# Patient Record
Sex: FEMALE | Race: WHITE | Employment: FULL TIME | URBAN - METROPOLITAN AREA
[De-identification: names, ages, dates, MRNs, and addresses within clinical notes are randomized per-mention and may not be internally consistent; named-entity substitution may affect disease eponyms.]

---

## 2018-07-20 ENCOUNTER — HOSPITAL ENCOUNTER (OUTPATIENT)
Dept: MAMMOGRAPHY | Age: 43
Discharge: HOME | End: 2018-07-22
Payer: COMMERCIAL

## 2018-07-20 DIAGNOSIS — Z12.39 BREAST SCREENING: ICD-10-CM

## 2018-07-20 PROCEDURE — 77067 SCR MAMMO BI INCL CAD: CPT

## 2020-09-21 ENCOUNTER — HOSPITAL ENCOUNTER (OUTPATIENT)
Dept: MAMMOGRAPHY | Age: 45
Discharge: HOME | End: 2020-09-23
Payer: COMMERCIAL

## 2020-09-21 PROCEDURE — 77063 BREAST TOMOSYNTHESIS BI: CPT

## 2021-12-09 ENCOUNTER — HOSPITAL ENCOUNTER (OUTPATIENT)
Dept: MAMMOGRAPHY | Age: 46
Discharge: HOME | End: 2021-12-11
Payer: COMMERCIAL

## 2021-12-09 VITALS — HEIGHT: 67 IN | WEIGHT: 155 LBS | BODY MASS INDEX: 24.33 KG/M2

## 2021-12-09 DIAGNOSIS — Z12.31 ENCOUNTER FOR SCREENING MAMMOGRAM FOR MALIGNANT NEOPLASM OF BREAST: ICD-10-CM

## 2021-12-09 PROCEDURE — 77063 BREAST TOMOSYNTHESIS BI: CPT

## 2023-01-17 ENCOUNTER — HOSPITAL ENCOUNTER (OUTPATIENT)
Dept: MAMMOGRAPHY | Age: 48
Discharge: HOME | End: 2023-01-19
Payer: COMMERCIAL

## 2023-01-17 VITALS — HEIGHT: 67 IN | WEIGHT: 165 LBS | BODY MASS INDEX: 25.9 KG/M2

## 2023-01-17 DIAGNOSIS — Z12.31 ENCOUNTER FOR SCREENING MAMMOGRAM FOR MALIGNANT NEOPLASM OF BREAST: ICD-10-CM

## 2023-01-17 PROCEDURE — 77067 SCR MAMMO BI INCL CAD: CPT

## 2024-02-15 ENCOUNTER — HOSPITAL ENCOUNTER (OUTPATIENT)
Dept: MAMMOGRAPHY | Age: 49
Discharge: HOME | End: 2024-02-17
Attending: OBSTETRICS & GYNECOLOGY
Payer: COMMERCIAL

## 2024-02-15 DIAGNOSIS — Z12.31 SCREENING MAMMOGRAM FOR HIGH-RISK PATIENT: ICD-10-CM

## 2024-02-15 PROCEDURE — 77063 BREAST TOMOSYNTHESIS BI: CPT

## 2024-08-12 ENCOUNTER — HOSPITAL ENCOUNTER (EMERGENCY)
Age: 49
Discharge: HOME | End: 2024-08-12
Attending: EMERGENCY MEDICINE
Payer: COMMERCIAL

## 2024-08-12 ENCOUNTER — APPOINTMENT (OUTPATIENT)
Dept: ULTRASOUND IMAGING | Age: 49
End: 2024-08-12
Payer: COMMERCIAL

## 2024-08-12 VITALS
SYSTOLIC BLOOD PRESSURE: 131 MMHG | TEMPERATURE: 98.1 F | HEART RATE: 88 BPM | BODY MASS INDEX: 28.19 KG/M2 | OXYGEN SATURATION: 100 % | DIASTOLIC BLOOD PRESSURE: 8 MMHG | RESPIRATION RATE: 16 BRPM | WEIGHT: 180 LBS

## 2024-08-12 DIAGNOSIS — S86.811A STRAIN OF CALF MUSCLE, RIGHT, INITIAL ENCOUNTER: ICD-10-CM

## 2024-08-12 DIAGNOSIS — M79.661 RIGHT CALF PAIN: Primary | ICD-10-CM

## 2024-08-12 PROCEDURE — 93971 EXTREMITY STUDY: CPT

## 2024-08-12 PROCEDURE — 99284 EMERGENCY DEPT VISIT MOD MDM: CPT

## 2024-08-12 ASSESSMENT — ENCOUNTER SYMPTOMS
BACK PAIN: 0
COUGH: 0
VOMITING: 0
SORE THROAT: 0
WHEEZING: 0
SINUS PRESSURE: 0
ABDOMINAL PAIN: 0
NAUSEA: 0
SHORTNESS OF BREATH: 0
DIARRHEA: 0

## 2024-08-12 ASSESSMENT — LIFESTYLE VARIABLES: HOW MANY STANDARD DRINKS CONTAINING ALCOHOL DO YOU HAVE ON A TYPICAL DAY: PATIENT DOES NOT DRINK

## 2024-08-12 ASSESSMENT — PAIN SCALES - GENERAL: PAINLEVEL_OUTOF10: 6

## 2024-08-12 ASSESSMENT — PAIN - FUNCTIONAL ASSESSMENT: PAIN_FUNCTIONAL_ASSESSMENT: 0-10

## 2024-08-12 NOTE — ED PROVIDER NOTES
Chief complaint:  Right calf pain      HPI history provided by the patient  Patient presents here complaining of right calf pain.  She would like an ultrasound, concerned that she has a DVT.  States he has a history of clotting disorder during pregnancy but has never actually had a blood clot.  States that about a week ago she pulled her right calf with a partial tear and has been wearing a walking boot since then and just recently was taking the boot off and tried to walk without it and noticed that she had increased pain in the calf again and now has some bruising down along the medial foot although did not injure the foot has no foot pain there.  Denies general calf swelling or pretibial edema and denies chest pain or palpitations or shortness of breath and has no general leg swelling.  Pain isolated to the mid posterior calf area.  No extremity numbness or tingling or paresthesia or weakness.    Review of Systems   Constitutional:  Negative for chills, diaphoresis, fatigue and fever.   HENT:  Negative for ear pain, sinus pressure and sore throat.    Respiratory:  Negative for cough, shortness of breath and wheezing.    Cardiovascular:  Negative for chest pain, palpitations and leg swelling.   Gastrointestinal:  Negative for abdominal pain, diarrhea, nausea and vomiting.   Genitourinary:  Negative for dysuria, frequency and urgency.   Musculoskeletal:  Positive for myalgias. Negative for arthralgias, back pain, gait problem, joint swelling, neck pain and neck stiffness.   Skin:  Negative for rash and wound.   Neurological:  Negative for dizziness, seizures, syncope, weakness, light-headedness, numbness and headaches.   Hematological:  Negative for adenopathy. Does not bruise/bleed easily.   All other systems reviewed and are negative.       Physical Exam  Vitals and nursing note reviewed.   Constitutional:       General: She is awake. She is not in acute distress.     Appearance: She is well-developed. She is not

## 2024-08-12 NOTE — ED NOTES
Department of Emergency Medicine  FIRST PROVIDER TRIAGE NOTE             Independent MLP           8/12/24  4:10 PM EDT    Date of Encounter: 8/12/24   MRN: [id number]      HPI: Naty Parker is a 49 y.o. female who presents to the ED for Leg Pain (Rt calf pain swelling / inj to muscle 1 week ago / no better after seen at )   Patient reports she tore her right calf muscle 1 week ago playing pickleball. Was evaluated and put in a walking boot. Having increased pain and swelling now. Concerned for DVT.     ROS: Negative for cp or sob.    PE: Gen Appearance/Constitutional: alert  Musculoskeletal: moves all extremities x 4    Initial Plan of Care: All treatment areas with department are currently occupied.      Plan to order/Initiate the following while awaiting opening in ED: Triage evaluation  .     Provider-Patient relationship only established for Provider In Triage (PIT).  Full assessment, HPI and examination not performed, therefore, it is not yet possible to state whether or not an emergency medical condition exists     Initial Plan of Care: Initiate Treatment-Testing, Proceed toTreatment Area When Bed Available for ED Attending/MLP to Continue Care  Secondary to high volume, low staffing, and/or boarding- patient to await bed availability.     This ends my PIT-Patient relationship.  Care of patient relinquished after triage         Electronically signed by LUISA Estevez   DD: 8/12/24       Beulah Rincon PA  08/12/24 [id number]

## 2024-08-15 SDOH — HEALTH STABILITY: PHYSICAL HEALTH: ON AVERAGE, HOW MANY MINUTES DO YOU ENGAGE IN EXERCISE AT THIS LEVEL?: 30 MIN

## 2024-08-15 SDOH — HEALTH STABILITY: PHYSICAL HEALTH: ON AVERAGE, HOW MANY DAYS PER WEEK DO YOU ENGAGE IN MODERATE TO STRENUOUS EXERCISE (LIKE A BRISK WALK)?: 7 DAYS

## 2024-08-16 ENCOUNTER — OFFICE VISIT (OUTPATIENT)
Dept: ORTHOPEDIC SURGERY | Age: 49
End: 2024-08-16
Payer: COMMERCIAL

## 2024-08-16 VITALS — WEIGHT: 180 LBS | BODY MASS INDEX: 28.25 KG/M2 | HEIGHT: 67 IN | TEMPERATURE: 98 F

## 2024-08-16 DIAGNOSIS — S86.111A GASTROCNEMIUS MUSCLE STRAIN, RIGHT, INITIAL ENCOUNTER: ICD-10-CM

## 2024-08-16 DIAGNOSIS — S86.811A STRAIN OF CALF MUSCLE, RIGHT, INITIAL ENCOUNTER: Primary | ICD-10-CM

## 2024-08-16 PROCEDURE — 99203 OFFICE O/P NEW LOW 30 MIN: CPT | Performed by: ORTHOPAEDIC SURGERY

## 2024-08-16 ASSESSMENT — ENCOUNTER SYMPTOMS
EYE DISCHARGE: 0
SHORTNESS OF BREATH: 0
ABDOMINAL DISTENTION: 0
ALLERGIC/IMMUNOLOGIC NEGATIVE: 1

## 2024-08-16 NOTE — PROGRESS NOTES
Naty Parker (:  1975) is a 49 y.o. female,New patient, here for evaluation of the following chief complaint(s):  Leg Pain (Right calf tear. Was playing pickle ball. DOI 8/3/24.)      Assessment & Plan   ASSESSMENT/PLAN:  1. Strain of calf muscle, right, initial encounter  2. Gastrocnemius muscle strain, right, initial encounter  Possible partial tear of medial gastroc head  Offered MRI but declined at this time    Heel lift provided for boot  WBAT  Knee scooter or crutches  Can wean out of heel lift as above over next 4-6 weeks  PT to follow if needed    Return in about 4 weeks (around 2024).         Subjective   SUBJECTIVE/OBJECTIVE:  HPI    49-year-old female here today to discuss her right leg.  She was playing pickle ball about 2 weeks ago where she felt a pop in the posterior aspect of her leg.  She went to the emergency department where an ultrasound was obtained which demonstrated a fluid collection in the proximal medial calf.  There is concern for a calf strain with partial proximal medial gastroc head tear.  She has been wearing a boot and has been weightbearing as tolerated.  She notes that her bruising and swelling has improved.  She is now able to bear weight.  She is here today to discuss further treatment options.    Past Medical History:   Diagnosis Date    Melanoma (HCC)      Past Surgical History:   Procedure Laterality Date    BREAST LUMPECTOMY      1998 right    BREAST SURGERY       SECTION        Family History   Problem Relation Age of Onset    Rectal Cancer Father       Social History     Tobacco Use    Smoking status: Former     Current packs/day: 0.00     Types: Cigarettes     Quit date: 1997     Years since quittin.2   Substance Use Topics    Alcohol use: No    Drug use: No        Review of Systems   Constitutional:  Positive for activity change.   HENT:  Negative for congestion.    Eyes:  Negative for discharge.   Respiratory:  Negative for shortness of

## 2024-09-13 ENCOUNTER — OFFICE VISIT (OUTPATIENT)
Dept: ORTHOPEDIC SURGERY | Age: 49
End: 2024-09-13
Payer: COMMERCIAL

## 2024-09-13 VITALS — HEIGHT: 67 IN | TEMPERATURE: 98.6 F | BODY MASS INDEX: 28.25 KG/M2 | WEIGHT: 180 LBS

## 2024-09-13 DIAGNOSIS — S86.111A GASTROCNEMIUS MUSCLE STRAIN, RIGHT, INITIAL ENCOUNTER: Primary | ICD-10-CM

## 2024-09-13 DIAGNOSIS — S86.811A STRAIN OF CALF MUSCLE, RIGHT, INITIAL ENCOUNTER: ICD-10-CM

## 2024-09-13 PROCEDURE — 99213 OFFICE O/P EST LOW 20 MIN: CPT | Performed by: ORTHOPAEDIC SURGERY

## 2024-09-13 ASSESSMENT — ENCOUNTER SYMPTOMS
ALLERGIC/IMMUNOLOGIC NEGATIVE: 1
EYE DISCHARGE: 0
ABDOMINAL DISTENTION: 0
SHORTNESS OF BREATH: 0

## 2024-09-19 ENCOUNTER — EVALUATION (OUTPATIENT)
Dept: PHYSICAL THERAPY | Age: 49
End: 2024-09-19

## 2024-09-19 DIAGNOSIS — S86.111A STRAIN OF RIGHT GASTROCNEMIUS MUSCLE, INITIAL ENCOUNTER: Primary | ICD-10-CM

## 2024-09-22 PROBLEM — S86.111A STRAIN OF RIGHT GASTROCNEMIUS MUSCLE: Status: ACTIVE | Noted: 2024-09-22

## 2024-09-26 ENCOUNTER — TREATMENT (OUTPATIENT)
Dept: PHYSICAL THERAPY | Age: 49
End: 2024-09-26
Payer: COMMERCIAL

## 2024-09-26 DIAGNOSIS — S86.111A STRAIN OF RIGHT GASTROCNEMIUS MUSCLE, INITIAL ENCOUNTER: Primary | ICD-10-CM

## 2024-09-26 PROCEDURE — 97110 THERAPEUTIC EXERCISES: CPT

## 2024-10-09 ENCOUNTER — TREATMENT (OUTPATIENT)
Dept: PHYSICAL THERAPY | Age: 49
End: 2024-10-09
Payer: COMMERCIAL

## 2024-10-09 DIAGNOSIS — S86.111A STRAIN OF RIGHT GASTROCNEMIUS MUSCLE, INITIAL ENCOUNTER: Primary | ICD-10-CM

## 2024-10-09 PROCEDURE — 97110 THERAPEUTIC EXERCISES: CPT

## 2024-10-21 ENCOUNTER — TREATMENT (OUTPATIENT)
Dept: PHYSICAL THERAPY | Age: 49
End: 2024-10-21
Payer: COMMERCIAL

## 2024-10-21 DIAGNOSIS — S86.111A STRAIN OF RIGHT GASTROCNEMIUS MUSCLE, INITIAL ENCOUNTER: Primary | ICD-10-CM

## 2024-10-21 PROCEDURE — 97530 THERAPEUTIC ACTIVITIES: CPT

## 2024-10-21 PROCEDURE — 97110 THERAPEUTIC EXERCISES: CPT

## 2024-10-21 NOTE — PROGRESS NOTES
Physical Therapy Daily Treatment Note    Date: 10/23/2024  Patient Name: Naty Parker  : 1975   MRN: [id number]  DOInjury: 8/3/2024   DOSx: -  Referring Provider: [place] [address]     Medical Diagnosis:      Diagnosis Orders   1. Strain of right gastrocnemius muscle, initial encounter            X = TO BE PERFORMED NEXT VISIT  > = PROGRESS TO THIS    S: Pt reports jog/walking a 5k over the weekend with mild discomfort that she accounts to being out of shape.   O: Discussed anatomy, physiology, body mechanics, principles of loading, and progressive loading/activity.  Reviewed home exercise program extensively; written copy provided.   Access Code: [email or web address]  URL: [email or web address]  Date: 2024  Prepared by: Merlin Meza    Exercises  - Standing Heel Raise  - 1 x daily - 7 x weekly - 3 sets - 12 reps - 2 sec hold      Time 7491-1586     Visit Eval + 3/4 Repeat outcome measure at mid point and end.    Pain Pain with activity 2/10     ROM      Modalities         MO   Manual            Stretch      Gastro 10 x 10 sec  TE   Soleus 10 x 10 sec  TE         Exercise      Bike     Treadmill  X 8 min     [] TG  [x] Leg Press 2-leg  TE   [] TG  [] Leg Press 1-leg   TE   Hamstring Curl Machine   TE   Knee Extension Machine   TE   Step-ups - FWD   TA   Step-ups - LAT   TA   Step-ups - BWD   TA   Lunges     [facility] split squats     Calf Raises  TA      TA   Eccentric CR  TA   SL CR on floor     HEP Strength, agility and dynamic warm in MEDIA INSTRUCTED IN, REVIEWED extensively and Partially performed all activity with questions answered     A:  Tolerated well.   Instructed in and performed HEP for her to use to prepare to return to running and pickle ball. Pt partially performed all exercises, and was comfortable with the HEP. HEP scanned into media file of patients chart  P: Continue with rehab plan  Rachel Oconnor PTA    Treatment Charges: Mins

## 2024-11-11 ENCOUNTER — TREATMENT (OUTPATIENT)
Dept: PHYSICAL THERAPY | Age: 49
End: 2024-11-11
Payer: COMMERCIAL

## 2024-11-11 DIAGNOSIS — S86.111A STRAIN OF RIGHT GASTROCNEMIUS MUSCLE, INITIAL ENCOUNTER: Primary | ICD-10-CM

## 2024-11-11 PROCEDURE — 97110 THERAPEUTIC EXERCISES: CPT

## 2024-11-11 NOTE — PROGRESS NOTES
Physical Therapy Daily Treatment Note    Date: 2024  Patient Name: Naty Parker  : 1975   MRN: [id number]  DOInjury: 8/3/2024   DOSx: -  Referring Provider: [place] [address]     Medical Diagnosis:      Diagnosis Orders   1. Strain of right gastrocnemius muscle, initial encounter              X = TO BE PERFORMED NEXT VISIT  > = PROGRESS TO THIS    S: Pt states she has been doing good  past three weeks. She has been progressively walk/jogging with no issues. Woke up Friday morning with posterior knee pain. She is also c/o feeling off balance, weakness and stiffness. States it takes a minute to get going once she sits for a bit.  Pain has decreased since Friday.   O: Discussed anatomy, physiology, body mechanics, principles of loading, and progressive loading/activity.  Reviewed home exercise program extensively; written copy provided.   Access Code: [email or web address]  URL: [email or web address]  Date: 2024  Prepared by: Merlin Meza    Exercises  - Standing Heel Raise  - 1 x daily - 7 x weekly - 3 sets - 12 reps - 2 sec hold      Time 5173-5291     Visit Eval + 3/4 Repeat outcome measure at mid point and end.    Pain Pain with activity 2/10     ROM      Modalities         MO   Manual            Stretch      Gastro 10 x 10 sec  TE   Soleus 10 x 10 sec  TE         Exercise      Bike     Treadmill     [] TG  [x] Leg Press 2-leg  TE   [] TG  [] Leg Press 1-leg   TE   Hamstring Curl Machine   TE   Knee Extension Machine   TE   Step-ups - FWD   TA   Step-ups - LAT   TA   Step-ups - BWD   TA   Lunges     [facility] split squats     Calf Raises  TA      TA   Eccentric CR  TA   SL CR on floor 3 x 10      HEP Strength, agility and dynamic warm in MEDIA INSTRUCTED IN, REVIEWED extensively and Partially performed all activity with questions answered     A:  Tolerated well.   Performed stretches and CR without issue. Performed only exercises that did not

## 2024-11-18 ENCOUNTER — TREATMENT (OUTPATIENT)
Dept: PHYSICAL THERAPY | Age: 49
End: 2024-11-18

## 2024-11-18 DIAGNOSIS — S86.111A STRAIN OF RIGHT GASTROCNEMIUS MUSCLE, INITIAL ENCOUNTER: Primary | ICD-10-CM

## 2024-11-18 NOTE — PROGRESS NOTES
Physical Therapy Daily Treatment Note    Date: 2024  Patient Name: Naty Parker  : 1975   MRN: [id number]  DOInjury: 8/3/2024   DOSx: -  Referring Provider: [place] [address]     Medical Diagnosis:      Diagnosis Orders   1. Strain of right gastrocnemius muscle, initial encounter                X = TO BE PERFORMED NEXT VISIT  > = PROGRESS TO THIS    S: Pt states she has been doing good  past three weeks. She has been progressively walk/jogging with no issues. Woke up Friday morning with posterior knee pain. She is also c/o feeling off balance, weakness and stiffness. States it takes a minute to get going once she sits for a bit.  Pain has decreased since Friday.   O: Discussed anatomy, physiology, body mechanics, principles of loading, and progressive loading/activity.  Reviewed home exercise program extensively; written copy provided.   Access Code: [email or web address]  URL: [email or web address]  Date: 2024  Prepared by: Merlin Meza    Exercises  - Standing Heel Raise  - 1 x daily - 7 x weekly - 3 sets - 12 reps - 2 sec hold      Time 2239-2193     Visit Eval + 3/4 Repeat outcome measure at mid point and end.    Pain Pain with activity 2/10     ROM      Modalities         MO   Manual            Stretch      Gastro  TE   Soleus 10 x 10 sec  TE         Exercise      Bike     Treadmill     [x] TG  [] Leg Press 2-leg L20 3 x 15  TE   [] TG  [] Leg Press 1-leg   TE   Hamstring Curl Machine   TE   Knee Extension Machine   TE   LAQ 7# 3 x 10     Step-ups - FWD   TA   Step-ups - LAT   TA   Step-ups - BWD   TA   Lunges     [facility] split squats     Calf Raises 3 x 10 3 sec up/3 sec down  TA      TA   Eccentric CR  TA   SL CR on floor 3 x 10      Slow marching in hallway 2 x 50 ft     HEP Strength, agility and dynamic warm in MEDIA INSTRUCTED IN, REVIEWED extensively and Partially performed all activity with questions answered     A:  Tolerated well.

## 2025-02-26 ENCOUNTER — TRANSCRIBE ORDERS (OUTPATIENT)
Dept: ADMINISTRATIVE | Age: 50
End: 2025-02-26

## 2025-02-26 DIAGNOSIS — Z12.31 ENCOUNTER FOR SCREENING MAMMOGRAM FOR MALIGNANT NEOPLASM OF BREAST: Primary | ICD-10-CM

## 2025-02-27 LAB
CLINICAL INFORMATION: NORMAL
COMMENT: NORMAL
COMMENT: NORMAL
DATE OF PREVIOUS BIOPSY: NORMAL
DATE OF PREVIOUS PAP: NORMAL
HB: COMMENT: NORMAL
HB: CYTOTECHNOLT: NORMAL
HB: INTERPT: NORMAL
HB: SOURCE: NORMAL
HPV MRNA E6/E7: NOT DETECTED
LAST MENSTRUAL PERIOD: NORMAL
SPECIMEN ADEQUACY:: NORMAL

## 2025-03-04 ENCOUNTER — HOSPITAL ENCOUNTER (OUTPATIENT)
Dept: MAMMOGRAPHY | Age: 50
Discharge: HOME | End: 2025-03-06
Attending: OBSTETRICS & GYNECOLOGY
Payer: COMMERCIAL

## 2025-03-04 VITALS — BODY MASS INDEX: 28.25 KG/M2 | HEIGHT: 67 IN | WEIGHT: 180 LBS

## 2025-03-04 DIAGNOSIS — Z12.31 ENCOUNTER FOR SCREENING MAMMOGRAM FOR MALIGNANT NEOPLASM OF BREAST: ICD-10-CM

## 2025-03-04 PROCEDURE — 77063 BREAST TOMOSYNTHESIS BI: CPT

## 2025-03-17 SDOH — HEALTH STABILITY: PHYSICAL HEALTH: ON AVERAGE, HOW MANY DAYS PER WEEK DO YOU ENGAGE IN MODERATE TO STRENUOUS EXERCISE (LIKE A BRISK WALK)?: 6 DAYS

## 2025-03-17 SDOH — HEALTH STABILITY: PHYSICAL HEALTH: ON AVERAGE, HOW MANY MINUTES DO YOU ENGAGE IN EXERCISE AT THIS LEVEL?: 30 MIN

## 2025-03-18 ENCOUNTER — OFFICE VISIT (OUTPATIENT)
Dept: FAMILY MEDICINE CLINIC | Age: 50
End: 2025-03-18
Payer: COMMERCIAL

## 2025-03-18 VITALS
BODY MASS INDEX: 29.19 KG/M2 | RESPIRATION RATE: 18 BRPM | DIASTOLIC BLOOD PRESSURE: 81 MMHG | OXYGEN SATURATION: 99 % | HEART RATE: 66 BPM | WEIGHT: 186 LBS | HEIGHT: 67 IN | SYSTOLIC BLOOD PRESSURE: 110 MMHG | TEMPERATURE: 97.8 F

## 2025-03-18 DIAGNOSIS — Z83.49 FAMILY HISTORY OF THYROID DISEASE: ICD-10-CM

## 2025-03-18 DIAGNOSIS — R13.10 DYSPHAGIA, UNSPECIFIED TYPE: Primary | ICD-10-CM

## 2025-03-18 DIAGNOSIS — Z11.59 NEED FOR HEPATITIS C SCREENING TEST: ICD-10-CM

## 2025-03-18 DIAGNOSIS — Z00.00 ANNUAL PHYSICAL EXAM: ICD-10-CM

## 2025-03-18 DIAGNOSIS — Z11.4 SCREENING FOR HIV (HUMAN IMMUNODEFICIENCY VIRUS): ICD-10-CM

## 2025-03-18 DIAGNOSIS — Z82.49 FAMILY HISTORY OF HEART DISEASE: ICD-10-CM

## 2025-03-18 DIAGNOSIS — E04.1 THYROID NODULE: ICD-10-CM

## 2025-03-18 DIAGNOSIS — Z15.89 MTHFR GENE MUTATION: ICD-10-CM

## 2025-03-18 DIAGNOSIS — Z83.3 FAMILY HISTORY OF DIABETES MELLITUS: ICD-10-CM

## 2025-03-18 PROCEDURE — 99204 OFFICE O/P NEW MOD 45 MIN: CPT | Performed by: FAMILY MEDICINE

## 2025-03-18 RX ORDER — CYANOCOBALAMIN/FOLIC AC/VIT B6 1-2.5-25MG
1 TABLET ORAL DAILY
COMMUNITY

## 2025-03-18 SDOH — ECONOMIC STABILITY: FOOD INSECURITY: WITHIN THE PAST 12 MONTHS, YOU WORRIED THAT YOUR FOOD WOULD RUN OUT BEFORE YOU GOT MONEY TO BUY MORE.: PATIENT DECLINED

## 2025-03-18 SDOH — ECONOMIC STABILITY: FOOD INSECURITY: WITHIN THE PAST 12 MONTHS, THE FOOD YOU BOUGHT JUST DIDN'T LAST AND YOU DIDN'T HAVE MONEY TO GET MORE.: PATIENT DECLINED

## 2025-03-18 ASSESSMENT — PATIENT HEALTH QUESTIONNAIRE - PHQ9
SUM OF ALL RESPONSES TO PHQ QUESTIONS 1-9: 0
SUM OF ALL RESPONSES TO PHQ QUESTIONS 1-9: 0
2. FEELING DOWN, DEPRESSED OR HOPELESS: NOT AT ALL
SUM OF ALL RESPONSES TO PHQ QUESTIONS 1-9: 0
1. LITTLE INTEREST OR PLEASURE IN DOING THINGS: NOT AT ALL
SUM OF ALL RESPONSES TO PHQ QUESTIONS 1-9: 0

## 2025-03-18 NOTE — PROGRESS NOTES
Naty Parker   Patient is a 49 y.o. year old female who presents with:  Chief Complaint   Patient presents with    New Patient     Prior PCP Kelsie; [facility]; [facility]    Dysphagia     Occasionally will notice diff swallowing dry food but randomly occurs; usually has to drink to help it go down     Patient also follows with: derm, OBGYN    History of Present Illness  The patient is a 49-year-old female who presents to hospitals care.    She has been experiencing intermittent dysphagia, particularly with dry foods such as chicken breast and waffles, for an extended period. The frequency of this symptom has increased over the past year, with the most recent episode occurring at a restaurant within the last couple of months. She reports no difficulty swallowing liquids. She has not undergone an upper endoscopy and does not experience symptoms suggestive of gastroesophageal reflux disease or heartburn. She also does not report a sensation of a lump in her throat. She experiences reflux less than once a month, typically after consuming spicy foods like salsa.    She is under the care of a dermatologist and gynecologist. She takes a B complex vitamin, multivitamin, iron supplement, and aspirin. She donates blood every 8 weeks and takes the iron supplement before and after donation because she does not pass the iron test after 3 or 4 donations. She has annual blood work done through [employer], with the most recent set done about a year ago. She is due for blood work this year. She had a colon cancer screening less than 5 years ago, which was normal, and she was advised to repeat it every 5 years. She has never been told her vitamin D or B vitamins were low. She has not seen a hematologist. She is due for a physical exam.    She has a history of allergic rhinitis, long-term use of aspirin, anxiety, asthma, headaches, perimenopause, and a history of melanoma. She has had a breast lumpectomy and a .

## 2025-03-19 DIAGNOSIS — Z82.49 FAMILY HISTORY OF HEART DISEASE: ICD-10-CM

## 2025-03-19 DIAGNOSIS — Z11.4 SCREENING FOR HIV (HUMAN IMMUNODEFICIENCY VIRUS): ICD-10-CM

## 2025-03-19 DIAGNOSIS — R13.10 DYSPHAGIA, UNSPECIFIED TYPE: ICD-10-CM

## 2025-03-19 DIAGNOSIS — Z83.49 FAMILY HISTORY OF THYROID DISEASE: ICD-10-CM

## 2025-03-19 DIAGNOSIS — Z00.00 ANNUAL PHYSICAL EXAM: ICD-10-CM

## 2025-03-19 DIAGNOSIS — Z83.3 FAMILY HISTORY OF DIABETES MELLITUS: ICD-10-CM

## 2025-03-19 DIAGNOSIS — E04.1 THYROID NODULE: ICD-10-CM

## 2025-03-19 DIAGNOSIS — Z15.89 MTHFR GENE MUTATION: ICD-10-CM

## 2025-03-19 DIAGNOSIS — Z11.59 NEED FOR HEPATITIS C SCREENING TEST: ICD-10-CM

## 2025-03-19 LAB
ALBUMIN: 3.9 G/DL (ref 3.5–5.2)
ALP BLD-CCNC: 60 U/L (ref 35–104)
ALT SERPL-CCNC: 11 U/L (ref 0–32)
ANION GAP SERPL CALCULATED.3IONS-SCNC: 17 MMOL/L (ref 7–16)
AST SERPL-CCNC: 18 U/L (ref 0–31)
BASOPHILS ABSOLUTE: 0.04 K/UL (ref 0–0.2)
BASOPHILS RELATIVE PERCENT: 1 % (ref 0–2)
BILIRUB SERPL-MCNC: 0.2 MG/DL (ref 0–1.2)
BUN BLDV-MCNC: 10 MG/DL (ref 6–20)
CALCIUM SERPL-MCNC: 9.3 MG/DL (ref 8.6–10.2)
CHLORIDE BLD-SCNC: 105 MMOL/L (ref 98–107)
CHOLESTEROL, TOTAL: 157 MG/DL
CO2: 20 MMOL/L (ref 22–29)
CREAT SERPL-MCNC: 0.8 MG/DL (ref 0.5–1)
EOSINOPHILS ABSOLUTE: 0.15 K/UL (ref 0.05–0.5)
EOSINOPHILS RELATIVE PERCENT: 3 % (ref 0–6)
FOLATE: >20 NG/ML (ref 4.8–24.2)
GFR, ESTIMATED: >90 ML/MIN/1.73M2
GLUCOSE BLD-MCNC: 90 MG/DL (ref 74–99)
HBA1C MFR BLD: 5.8 % (ref 4–5.6)
HCT VFR BLD CALC: 39.1 % (ref 34–48)
HDLC SERPL-MCNC: 65 MG/DL
HEMOGLOBIN: 12.4 G/DL (ref 11.5–15.5)
IMMATURE GRANULOCYTES %: 0 % (ref 0–5)
IMMATURE GRANULOCYTES ABSOLUTE: <0.03 K/UL (ref 0–0.58)
LDL CHOLESTEROL: 82 MG/DL
LYMPHOCYTES ABSOLUTE: 1.36 K/UL (ref 1.5–4)
LYMPHOCYTES RELATIVE PERCENT: 25 % (ref 20–42)
MCH RBC QN AUTO: 27 PG (ref 26–35)
MCHC RBC AUTO-ENTMCNC: 31.7 G/DL (ref 32–34.5)
MCV RBC AUTO: 85 FL (ref 80–99.9)
MONOCYTES ABSOLUTE: 0.61 K/UL (ref 0.1–0.95)
MONOCYTES RELATIVE PERCENT: 11 % (ref 2–12)
NEUTROPHILS ABSOLUTE: 3.19 K/UL (ref 1.8–7.3)
NEUTROPHILS RELATIVE PERCENT: 60 % (ref 43–80)
PDW BLD-RTO: 14.6 % (ref 11.5–15)
PLATELET, FLUORESCENCE: 224 K/UL (ref 130–450)
PMV BLD AUTO: 13.6 FL (ref 7–12)
POTASSIUM SERPL-SCNC: 4.5 MMOL/L (ref 3.5–5)
RBC # BLD: 4.6 M/UL (ref 3.5–5.5)
SODIUM BLD-SCNC: 142 MMOL/L (ref 132–146)
T4 FREE: 1.1 NG/DL (ref 0.9–1.7)
TOTAL PROTEIN: 6.7 G/DL (ref 6.4–8.3)
TRIGL SERPL-MCNC: 49 MG/DL
TSH SERPL DL<=0.05 MIU/L-ACNC: 1.86 UIU/ML (ref 0.27–4.2)
VITAMIN B-12: 931 PG/ML (ref 211–946)
VITAMIN D 25-HYDROXY: 26.4 NG/ML (ref 30–100)
VLDLC SERPL CALC-MCNC: 10 MG/DL
WBC # BLD: 5.4 K/UL (ref 4.5–11.5)

## 2025-03-20 LAB
HEPATITIS C ANTIBODY: NONREACTIVE
HIV AG/AB: NONREACTIVE

## 2025-03-31 ENCOUNTER — HOSPITAL ENCOUNTER (OUTPATIENT)
Dept: ULTRASOUND IMAGING | Age: 50
Discharge: HOME | End: 2025-03-31
Payer: COMMERCIAL

## 2025-03-31 DIAGNOSIS — E04.1 THYROID NODULE: ICD-10-CM

## 2025-03-31 PROCEDURE — 76536 US EXAM OF HEAD AND NECK: CPT

## 2025-04-02 ENCOUNTER — OFFICE VISIT (OUTPATIENT)
Dept: FAMILY MEDICINE CLINIC | Age: 50
End: 2025-04-02
Payer: COMMERCIAL

## 2025-04-02 VITALS
SYSTOLIC BLOOD PRESSURE: 116 MMHG | WEIGHT: 187 LBS | RESPIRATION RATE: 16 BRPM | HEIGHT: 67 IN | HEART RATE: 70 BPM | BODY MASS INDEX: 29.35 KG/M2 | TEMPERATURE: 97.3 F | DIASTOLIC BLOOD PRESSURE: 85 MMHG | OXYGEN SATURATION: 100 %

## 2025-04-02 DIAGNOSIS — R73.03 PREDIABETES: ICD-10-CM

## 2025-04-02 DIAGNOSIS — E55.9 VITAMIN D DEFICIENCY: ICD-10-CM

## 2025-04-02 DIAGNOSIS — E04.1 THYROID NODULE: ICD-10-CM

## 2025-04-02 DIAGNOSIS — R13.10 DYSPHAGIA, UNSPECIFIED TYPE: ICD-10-CM

## 2025-04-02 DIAGNOSIS — Z00.00 ANNUAL PHYSICAL EXAM: Primary | ICD-10-CM

## 2025-04-02 PROCEDURE — 99396 PREV VISIT EST AGE 40-64: CPT | Performed by: FAMILY MEDICINE

## 2025-04-02 NOTE — PROGRESS NOTES
Naty Parker   Patient is a 49 y.o. year old female who presents with:  Chief Complaint   Patient presents with    Annual Exam     Wants to discuss US     History of Present Illness  The patient is a 49-year-old female who presents for an annual exam.    She has been experiencing dysphagia, particularly with dry foods such as pizza crust. There is no sensation of a lump in her throat, but she has noticed an increase in the volume of her swallowing sounds over the past year. Omeprazole has not been initiated, and a consultation with Dr. Mcpherson is being considered. She is curious if her symptoms could be attributed to scarring.    A history of thyroid nodules was first detected during an ultrasound at the age of 17, prompted by her gynecologist's discovery of a neck nodule. A subsequent endocrinology consultation in [place] 32 years ago revealed no abnormalities on ultrasound. Recent imaging showed two nodules on the left thyroid, and a fine needle aspiration is recommended to rule out malignancy.    She has recently started taking vitamin D supplements, having previously discontinued them. A history of muscle and joint injuries over the past 2 years is reported.    A1c levels have never been checked, but fasting blood glucose levels have consistently been within normal limits. She has a history of delivering two infants weighing over 10 pounds each but did not develop gestational diabetes.    A mammogram at [facility] in 02/2025 revealed extremely dense fibrocystic breasts. The radiologist recommended annual mammograms and did not see the need for an ultrasound.    Results  Labs   - Electrolytes: Normal   - Kidney Function: Normal   - Fasting Blood Glucose: 90   - A1c: 5.8   - LDL: Low   - HDL: 65   - Liver Function: Normal   - Thyroid Function: Normal   - Complete Blood Count: Normal   - Folate: Normal   - B12: Normal   - HIV Test: Negative   - Hepatitis C Test: Negative   - Vitamin D: 26    Imaging   -